# Patient Record
Sex: MALE | Race: WHITE | NOT HISPANIC OR LATINO | Employment: UNEMPLOYED | URBAN - METROPOLITAN AREA
[De-identification: names, ages, dates, MRNs, and addresses within clinical notes are randomized per-mention and may not be internally consistent; named-entity substitution may affect disease eponyms.]

---

## 2017-06-05 ENCOUNTER — APPOINTMENT (EMERGENCY)
Dept: RADIOLOGY | Facility: HOSPITAL | Age: 47
End: 2017-06-05
Payer: COMMERCIAL

## 2017-06-05 ENCOUNTER — HOSPITAL ENCOUNTER (EMERGENCY)
Facility: HOSPITAL | Age: 47
Discharge: HOME | End: 2017-06-05
Attending: EMERGENCY MEDICINE
Payer: COMMERCIAL

## 2017-06-05 VITALS
RESPIRATION RATE: 18 BRPM | OXYGEN SATURATION: 97 % | HEART RATE: 72 BPM | DIASTOLIC BLOOD PRESSURE: 97 MMHG | SYSTOLIC BLOOD PRESSURE: 144 MMHG

## 2017-06-05 DIAGNOSIS — M25.562 LEFT KNEE PAIN: ICD-10-CM
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
## 2017-06-05 DIAGNOSIS — M25.512 LEFT SHOULDER PAIN: ICD-10-CM

## 2017-06-05 LAB
ANION GAP BLD CALC-SCNC: 17 MMOL/L (ref 4–13)
BUN BLD-MCNC: 8 MG/DL (ref 5–25)
CA-I BLD-SCNC: 1.21 MMOL/L (ref 1.12–1.32)
CHLORIDE BLD-SCNC: 100 MMOL/L (ref 100–108)
CREAT BLD-MCNC: 1 MG/DL (ref 0.6–1.3)
GFR SERPL CREATININE-BSD FRML MDRD: >60 ML/MIN/1.73SQ M
GLUCOSE SERPL-MCNC: 98 MG/DL (ref 65–140)
HCT VFR BLD CALC: 47 % (ref 36.5–49.3)
HGB BLDA-MCNC: 16 G/DL (ref 12–17)
PCO2 BLD: 27 MMOL/L (ref 21–32)
POTASSIUM BLD-SCNC: 3.8 MMOL/L (ref 3.5–5.3)
SODIUM BLD-SCNC: 139 MMOL/L (ref 136–145)
SPECIMEN SOURCE: ABNORMAL

## 2017-06-05 PROCEDURE — 99284 EMERGENCY DEPT VISIT MOD MDM: CPT

## 2017-06-05 PROCEDURE — 73030 X-RAY EXAM OF SHOULDER: CPT

## 2017-06-05 PROCEDURE — 85014 HEMATOCRIT: CPT

## 2017-06-05 PROCEDURE — 74177 CT ABD & PELVIS W/CONTRAST: CPT

## 2017-06-05 PROCEDURE — 96374 THER/PROPH/DIAG INJ IV PUSH: CPT

## 2017-06-05 PROCEDURE — 72125 CT NECK SPINE W/O DYE: CPT

## 2017-06-05 PROCEDURE — 80047 BASIC METABLC PNL IONIZED CA: CPT

## 2017-06-05 PROCEDURE — 73564 X-RAY EXAM KNEE 4 OR MORE: CPT

## 2017-06-05 RX ORDER — KETOROLAC TROMETHAMINE 30 MG/ML
15 INJECTION, SOLUTION INTRAMUSCULAR; INTRAVENOUS ONCE
Status: COMPLETED | OUTPATIENT
Start: 2017-06-05 | End: 2017-06-05

## 2017-06-05 RX ORDER — CYCLOBENZAPRINE HCL 10 MG
10 TABLET ORAL 3 TIMES DAILY PRN
Status: DISCONTINUED | OUTPATIENT
Start: 2017-06-05 | End: 2017-06-06 | Stop reason: HOSPADM

## 2017-06-05 RX ADMIN — CYCLOBENZAPRINE HYDROCHLORIDE 10 MG: 10 TABLET ORAL at 21:15

## 2017-06-05 RX ADMIN — KETOROLAC TROMETHAMINE 15 MG: 30 INJECTION, SOLUTION INTRAMUSCULAR at 21:46

## 2017-06-05 RX ADMIN — IOHEXOL 100 ML: 350 INJECTION, SOLUTION INTRAVENOUS at 20:41

## 2018-11-27 PROBLEM — Z00.00 ENCOUNTER FOR PREVENTIVE HEALTH EXAMINATION: Status: ACTIVE | Noted: 2018-11-27

## 2019-01-15 ENCOUNTER — APPOINTMENT (OUTPATIENT)
Dept: SURGERY | Facility: CLINIC | Age: 49
End: 2019-01-15
Payer: MEDICAID

## 2019-01-15 VITALS — WEIGHT: 230 LBS | BODY MASS INDEX: 34.86 KG/M2 | HEIGHT: 68 IN

## 2019-01-15 DIAGNOSIS — M62.08 SEPARATION OF MUSCLE (NONTRAUMATIC), OTHER SITE: ICD-10-CM

## 2019-01-15 DIAGNOSIS — R19.00 INTRA-ABDOMINAL AND PELVIC SWELLING, MASS AND LUMP, UNSPECIFIED SITE: ICD-10-CM

## 2019-01-15 DIAGNOSIS — E66.09 OTHER OBESITY DUE TO EXCESS CALORIES: ICD-10-CM

## 2019-01-15 PROCEDURE — 99203 OFFICE O/P NEW LOW 30 MIN: CPT

## 2019-01-15 NOTE — PHYSICAL EXAM
[Normal Breath Sounds] : Normal breath sounds [No Rash or Lesion] : No rash or lesion [Alert] : alert [Calm] : calm [JVD] : no jugular venous distention  [de-identified] : overweight [de-identified] : normal [de-identified] : mildly protuberant abdomen\par  [de-identified] : diastasis recti

## 2019-01-15 NOTE — CONSULT LETTER
[Dear  ___] : Dear  [unfilled], [Courtesy Letter:] : I had the pleasure of seeing your patient, [unfilled], in my office today. [Please see my note below.] : Please see my note below. [Consult Closing:] : Thank you very much for allowing me to participate in the care of this patient.  If you have any questions, please do not hesitate to contact me. [FreeTextEntry3] : Respectfully,\par \par Steve Lane M.D., FACS\par  [DrTobin  ___] : Dr. GEORGE

## 2019-01-15 NOTE — ASSESSMENT
[FreeTextEntry1] : Jayy is a pleasant 48-year-old disabled gentleman with a past medical history significant for hypertension, hypercholesterolemia, migraine headaches, sleep apnea, arthritis, anxiety and depression, posttraumatic stress disorder, chronic pain due to multiple neck and back issues on daily narcotics with resultant chronic constipation who presents to the office with an enlarging and now intermittently mildly symptomatic bulge in the upper abdomen suspicious for a hernia. Of note, he was significantly heavier at one point and has lost the weight with dietary modification.\par \par Physical examination demonstrates a 2 fingerbreadth wide diastasis recti with no evidence of a true ventral hernia. He did have a triple hernia repair along with bilateral hydroceles by another surgeon in the past with no evidence of hernia recurrence at this time. His current BMI is 35.\par \par Jayy was counseled and reassured. We spoke about the etiology and natural progression of rectus diastasis and the importance of wearing an abdominal binder during any significant physical activity. The patient was also encouraged to avoid sit-ups and crunches, and was given educational materials offering alternative exercises to consider. We also discussed the importance of calorie restriction and healthy eating with regard to weight loss, hernia recurrence and one's overall health. He may return to me in the future if any hernia-related issues arise, of course.

## 2024-06-13 ENCOUNTER — OFFICE VISIT (OUTPATIENT)
Age: 54
End: 2024-06-13
Payer: MEDICARE

## 2024-06-13 VITALS
BODY MASS INDEX: 28.64 KG/M2 | WEIGHT: 189 LBS | OXYGEN SATURATION: 97 % | HEIGHT: 68 IN | DIASTOLIC BLOOD PRESSURE: 88 MMHG | SYSTOLIC BLOOD PRESSURE: 140 MMHG | HEART RATE: 94 BPM

## 2024-06-13 DIAGNOSIS — M62.838 MUSCLE SPASM: ICD-10-CM

## 2024-06-13 DIAGNOSIS — M99.02 SEGMENTAL DYSFUNCTION OF THORACIC REGION: ICD-10-CM

## 2024-06-13 DIAGNOSIS — M99.03 SEGMENTAL DYSFUNCTION OF LUMBAR REGION: Primary | ICD-10-CM

## 2024-06-13 DIAGNOSIS — M99.01 SEGMENTAL DYSFUNCTION OF CERVICAL REGION: ICD-10-CM

## 2024-06-13 DIAGNOSIS — M48.062 SPINAL STENOSIS OF LUMBAR REGION WITH NEUROGENIC CLAUDICATION: ICD-10-CM

## 2024-06-13 PROCEDURE — 98941 CHIROPRACT MANJ 3-4 REGIONS: CPT | Performed by: CHIROPRACTOR

## 2024-06-13 PROCEDURE — 99203 OFFICE O/P NEW LOW 30 MIN: CPT | Performed by: CHIROPRACTOR

## 2024-06-13 RX ORDER — METHYLNALTREXONE BROMIDE 150 MG/1
150 TABLET ORAL AS NEEDED
COMMUNITY
Start: 2024-06-07

## 2024-06-13 RX ORDER — IBUPROFEN 800 MG/1
800 TABLET ORAL EVERY 6 HOURS PRN
COMMUNITY
Start: 2024-06-08

## 2024-06-13 RX ORDER — ZOLPIDEM TARTRATE 10 MG/1
10 TABLET ORAL DAILY PRN
COMMUNITY
Start: 2024-05-08

## 2024-06-13 RX ORDER — DULOXETIN HYDROCHLORIDE 60 MG/1
60 CAPSULE, DELAYED RELEASE ORAL 2 TIMES DAILY
COMMUNITY
Start: 2024-06-07

## 2024-06-13 RX ORDER — FAMOTIDINE 40 MG/1
40 TABLET, FILM COATED ORAL DAILY
COMMUNITY

## 2024-06-13 RX ORDER — PRAZOSIN HYDROCHLORIDE 2 MG/1
2 CAPSULE ORAL
COMMUNITY
Start: 2024-05-14

## 2024-06-13 RX ORDER — HYDROCHLOROTHIAZIDE 25 MG/1
25 TABLET ORAL DAILY
COMMUNITY
Start: 2024-01-08

## 2024-06-13 RX ORDER — HYDROXYZINE 50 MG/1
50 TABLET, FILM COATED ORAL 3 TIMES DAILY
COMMUNITY
Start: 2024-05-29

## 2024-06-13 RX ORDER — KETOCONAZOLE 20 MG/ML
SHAMPOO TOPICAL ONCE
COMMUNITY
Start: 2024-04-15

## 2024-06-13 RX ORDER — HYALURONATE SODIUM 20 MG/2 ML
20 SYRINGE (ML) INTRAARTICULAR
COMMUNITY
Start: 2024-03-06

## 2024-06-13 RX ORDER — ATORVASTATIN CALCIUM 80 MG/1
80 TABLET, FILM COATED ORAL DAILY
COMMUNITY

## 2024-06-13 RX ORDER — ALBUTEROL SULFATE 90 UG/1
2 AEROSOL, METERED RESPIRATORY (INHALATION) EVERY 4 HOURS PRN
COMMUNITY
Start: 2024-05-24

## 2024-06-13 RX ORDER — SUCRALFATE 1 G/1
1 TABLET ORAL 4 TIMES DAILY
COMMUNITY
Start: 2024-04-29

## 2024-06-13 RX ORDER — POLYETHYLENE GLYCOL 3350 17 G/17G
17 POWDER, FOR SOLUTION ORAL AS NEEDED
COMMUNITY
Start: 2024-05-14

## 2024-06-13 RX ORDER — ENALAPRIL MALEATE 20 MG/1
1 TABLET ORAL 2 TIMES DAILY
COMMUNITY
Start: 2024-03-04

## 2024-06-13 RX ORDER — TIZANIDINE 4 MG/1
4 TABLET ORAL ONCE
COMMUNITY
Start: 2024-05-16

## 2024-06-13 RX ORDER — OXYCODONE AND ACETAMINOPHEN 10; 325 MG/1; MG/1
1 TABLET ORAL EVERY 8 HOURS PRN
COMMUNITY
Start: 2024-05-19

## 2024-06-13 RX ORDER — ALBUTEROL SULFATE 0.63 MG/3ML
0.63 SOLUTION RESPIRATORY (INHALATION) 3 TIMES DAILY PRN
COMMUNITY
Start: 2024-04-11

## 2024-06-13 RX ORDER — PANTOPRAZOLE SODIUM 40 MG/1
40 TABLET, DELAYED RELEASE ORAL DAILY
COMMUNITY
Start: 2024-04-01

## 2024-06-13 RX ORDER — CLOBETASOL PROPIONATE 0.46 MG/ML
SOLUTION TOPICAL 2 TIMES DAILY
COMMUNITY
Start: 2024-04-23

## 2024-06-13 RX ORDER — QUETIAPINE FUMARATE 300 MG/1
300 TABLET, FILM COATED ORAL
COMMUNITY
Start: 2024-05-14

## 2024-06-13 NOTE — PROGRESS NOTES
Initial date of service: 6/13/24    Diagnoses and all orders for this visit:    Segmental dysfunction of lumbar region    Spinal stenosis of lumbar region with neurogenic claudication    Segmental dysfunction of thoracic region    Segmental dysfunction of cervical region    Muscle spasm    Other orders  -     albuterol (ACCUNEB) 0.63 MG/3ML nebulizer solution; Take 0.63 mg by nebulization 3 (three) times a day as needed  -     albuterol (PROVENTIL HFA,VENTOLIN HFA) 90 mcg/act inhaler; Inhale 2 puffs every 4 (four) hours as needed for wheezing or shortness of breath  -     atorvastatin (LIPITOR) 80 mg tablet; Take 80 mg by mouth daily  -     clobetasol (TEMOVATE) 0.05 % external solution; Apply topically 2 (two) times a day  -     DULoxetine (CYMBALTA) 60 mg delayed release capsule; Take 60 mg by mouth 2 (two) times a day  -     enalapril (VASOTEC) 20 mg tablet; Take 1 tablet by mouth 2 (two) times a day  -     famotidine (PEPCID) 40 MG tablet; Take 40 mg by mouth daily  -     hydroCHLOROthiazide 25 mg tablet; Take 25 mg by mouth daily  -     hydrOXYzine HCL (ATARAX) 50 mg tablet; Take 50 mg by mouth 3 (three) times a day  -     ibuprofen (MOTRIN) 800 mg tablet; Take 800 mg by mouth every 6 (six) hours as needed  -     ketoconazole (NIZORAL) 2 % shampoo; Apply topically once  -     Relistor 150 MG TABS; Take 150 mg by mouth if needed  -     OxyCONTIN 40 MG 12 hr tablet; Take 40 mg by mouth every 8 (eight) hours  -     oxyCODONE-acetaminophen (PERCOCET)  mg per tablet; Take 1 tablet by mouth every 8 (eight) hours as needed  -     tiZANidine (ZANAFLEX) 4 mg tablet; Take 4 mg by mouth once  -     pantoprazole (PROTONIX) 40 mg tablet; Take 40 mg by mouth daily  -     polyethylene glycol (GLYCOLAX) 17 GM/SCOOP powder; Take 17 g by mouth if needed  -     prazosin (MINIPRESS) 2 mg capsule; Take 2 mg by mouth daily at bedtime  -     QUEtiapine (SEROquel) 300 mg tablet; Take 300 mg by mouth daily at bedtime  -      Euflexxa 20 MG/2ML SOSY; 20 mg  -     sucralfate (CARAFATE) 1 g tablet; Take 1 tablet by mouth 4 (four) times a day  -     zolpidem (AMBIEN) 10 mg tablet; Take 10 mg by mouth daily as needed    Pt's symptoms and exam findings consistent with mechanical neck/back pain secondary to repetitive st/sp injury in the setting of s/p cervical discectomy, L5/S1 fusion/listhesis/foraminal stenosis, exacerbated by core/glute deconditioning and postural/ergonomic stressors.   Pt responded well to IASTM, traction, stretches and manual mobilization of the affected spinal and myofascial jt dysfunction, with increased ROM; trial of conservative tx recommended consisting of stretching, ther-ex, graded mobilization/manipulation of the affected spinal/myofascial tissues, postural/ergonomic education, and take home stretches/exercises. Discussed we will not utilize HVLA to c/s and l/s due to prior surgeries.  If symptoms fail to improve with short trial of conservative care, appropriate imaging and referral will be coordinated.  Spent greater than 30 min c pt discussing hx, pe, ddx, tx options and reviewing notes/imaging    TREATMENT: [id number]  Fear avoidance behavior discussion, encouraged and reassured pt that natural course of condition is to improve over time with adherence to tx plan and home care strategies. Home care recommendations: avoid bed rest, walk (but avoid trails and uneven surfaces), gradual return to activity to tolerance (avoid anything that peripheralizes symptoms), ice 20 min on/off, watch for ice burn, call if symptoms peripheralize, worsen, or neurologic deficit progresses. Ther-ex: IASTM - discussed post procedure soreness and/or ecchymosis for up to 36 hrs, applied to affected mm hypertonicities; wall gurpreet, axial retraction, upper trap stretch, lev scap stretch, knee to chest stretch, supine t-stretch, transitional mvmt education, abdominal bracing;Thoracic mobilization/manipulation: prone P-A mob, supine A-P  manip; cervical mobilization/manipulation: traction, diversified supine graded mobilization; lumbar prone flexion-traction    HPI:  Margarito Goff is a 54 y.o. male   Chief Complaint   Patient presents with    Back Pain     Middle back pain-9  Lower back pain-9    Neck Pain     Neck pain-9     Pt presents for eval and tx for exacerbation of chronic neck/back pain secondary to DJD/DDD/stenosis/MS, hx of c/s fusion and l/s fusions; exacerbated by a bout of prolonged bedrest due to illness, which resulted in atrophy/weakness. Pt typically exercises in gym frequently. 2023 MRI demonstrates s/p fusion L5/S1, 1cm Spondylolytic spondylolisthesis of L5/S1 with moderate to severe foraminal stenosis. Pt reports hx of cervical discectomy. Pt reports responding well to chiro care in past; former chiro manipulated both c/s and l/s s/p surgeries      Back Pain  This is a chronic problem. The current episode started more than 1 year ago. The problem occurs daily. The problem has been waxing and waning since onset. The pain is present in the lumbar spine and thoracic spine. The quality of the pain is described as aching and stabbing. The pain radiates to the left knee, right thigh, right foot and left thigh. The pain is Worse during the day. The symptoms are aggravated by bending, position, standing and twisting.   Neck Pain   This is a chronic problem. The current episode started more than 1 year ago. The problem occurs daily. The problem has been waxing and waning. The pain is present in the left side, right side, midline and occipital region. The quality of the pain is described as aching and stabbing. The symptoms are aggravated by bending, position, stress and twisting. The pain is Worse during the day.     Past Medical History:   Diagnosis Date    Hypertension     Multiple sclerosis (HCC)       The following portions of the patient's history were reviewed and updated as appropriate: allergies, past family history, past medical  history, past social history, past surgical history, and problem list.  Review of Systems   Musculoskeletal:  Positive for back pain and neck pain.     Physical Exam  Eyes:      Extraocular Movements: Extraocular movements intact.   Neck:        Comments: Pnful and limited in Lrot, llf, Ext  Cardiovascular:      Pulses: Normal pulses.   Abdominal:      General: There is no distension.      Tenderness: There is no abdominal tenderness.   Musculoskeletal:      Cervical back: Pain with movement and muscular tenderness present. Decreased range of motion.      Thoracic back: Spasms and tenderness present. Decreased range of motion.      Lumbar back: Spasms and tenderness present. Decreased range of motion. Negative right straight leg raise test and negative left straight leg raise test.        Back:       Comments: Pnful and limited in Ext, Blf    Lymphadenopathy:      Cervical: No cervical adenopathy.   Skin:     General: Skin is warm and dry.   Neurological:      Mental Status: He is alert and oriented to person, place, and time.      Cranial Nerves: Cranial nerves 2-12 are intact.      Sensory: Sensation is intact.      Motor: Motor function is intact.      Coordination: Coordination is intact.      Gait: Gait is intact. Gait and tandem walk normal.      Deep Tendon Reflexes: Reflexes normal. Babinski sign absent on the right side. Babinski sign absent on the left side.      Reflex Scores:       Tricep reflexes are 2+ on the right side and 2+ on the left side.       Bicep reflexes are 2+ on the right side and 2+ on the left side.       Brachioradialis reflexes are 2+ on the right side and 2+ on the left side.       Patellar reflexes are 2+ on the right side and 2+ on the left side.       Achilles reflexes are 2+ on the right side and 2+ on the left side.  Psychiatric:         Mood and Affect: Mood and affect normal.         Behavior: Behavior normal.       SOFT TISSUE ASSESSMENT: Hypertonicity and tenderness palpated  B C5-T3, T6-9, L2-S1 erector spinae, upper traps, rhomboids JOINT RECTRICTIONS: C5-T3, T6-9, L3-S1 ORTHO: Myesha unremarkable for centralization/peripheralization; max foraminal comp elicits local np R/L; shoulder depression elicits stiffness in R/L upper trap; brachial plexus tension test elicits no neural tension in R/L UE; cervical distraction relieves CC; sitting root neg B; slump test neg B    Return in about 1 week (around 6/20/2024) for Next scheduled follow up.

## 2024-06-27 ENCOUNTER — PROCEDURE VISIT (OUTPATIENT)
Age: 54
End: 2024-06-27
Payer: MEDICARE

## 2024-06-27 VITALS
OXYGEN SATURATION: 98 % | SYSTOLIC BLOOD PRESSURE: 138 MMHG | BODY MASS INDEX: 28.64 KG/M2 | HEART RATE: 85 BPM | DIASTOLIC BLOOD PRESSURE: 86 MMHG | HEIGHT: 68 IN | WEIGHT: 189 LBS

## 2024-06-27 DIAGNOSIS — M99.01 SEGMENTAL DYSFUNCTION OF CERVICAL REGION: ICD-10-CM

## 2024-06-27 DIAGNOSIS — M62.838 MUSCLE SPASM: ICD-10-CM

## 2024-06-27 DIAGNOSIS — M99.02 SEGMENTAL DYSFUNCTION OF THORACIC REGION: ICD-10-CM

## 2024-06-27 DIAGNOSIS — M99.03 SEGMENTAL DYSFUNCTION OF LUMBAR REGION: Primary | ICD-10-CM

## 2024-06-27 DIAGNOSIS — M48.062 SPINAL STENOSIS OF LUMBAR REGION WITH NEUROGENIC CLAUDICATION: ICD-10-CM

## 2024-06-27 PROCEDURE — 98941 CHIROPRACT MANJ 3-4 REGIONS: CPT | Performed by: CHIROPRACTOR

## 2024-06-27 NOTE — PROGRESS NOTES
Initial date of service: 6/13/24    Diagnoses and all orders for this visit:    Segmental dysfunction of lumbar region    Spinal stenosis of lumbar region with neurogenic claudication    Segmental dysfunction of thoracic region    Segmental dysfunction of cervical region    Muscle spasm    Pt tolerated tx well    TREATMENT: [id number]  Ther-ex: IASTM - discussed post procedure soreness and/or ecchymosis for up to 36 hrs, applied to affected mm hypertonicities; wall gurpreet, axial retraction, upper trap stretch, lev scap stretch, knee to chest stretch, supine t-stretch, transitional mvmt education, abdominal bracing;Thoracic mobilization/manipulation: prone P-A mob, supine A-P manip; cervical mobilization/manipulation: traction, diversified supine graded mobilization; lumbar prone flexion-traction    HPI:  Margarito Goff is a 54 y.o. male   Chief Complaint   Patient presents with   • Back Pain     Middle back pain-8  Lower back pain-8   • Neck Pain     Neck pain-8     Pt presents for tx for exacerbation of chronic neck/back pain secondary to DJD/DDD/stenosis/MS, hx of c/s fusion and l/s fusions; exacerbated by a bout of prolonged bedrest due to illness, which resulted in atrophy/weakness. Pt typically exercises in gym frequently. 2023 MRI demonstrates s/p fusion L5/S1, 1cm Spondylolytic spondylolisthesis of L5/S1 with moderate to severe foraminal stenosis. Pt reports hx of cervical discectomy. Pt reports responding well to chiro care in past; former chiro manipulated both c/s and l/s s/p surgeries  6/27: pt reports feeling better after initial tx but suffered flare-up tossing and turning in bed    Back Pain  This is a chronic problem. The current episode started more than 1 year ago. The problem occurs daily. The problem has been waxing and waning since onset. The pain is present in the lumbar spine and thoracic spine. The quality of the pain is described as aching and stabbing. The pain radiates to the left knee, right thigh,  right foot and left thigh. The pain is Worse during the day. The symptoms are aggravated by bending, position, standing and twisting.   Neck Pain   This is a chronic problem. The current episode started more than 1 year ago. The problem occurs daily. The problem has been waxing and waning. The pain is present in the left side, right side, midline and occipital region. The quality of the pain is described as aching and stabbing. The symptoms are aggravated by bending, position, stress and twisting. The pain is Worse during the day.     Past Medical History:   Diagnosis Date   • Hypertension    • Multiple sclerosis (HCC)       The following portions of the patient's history were reviewed and updated as appropriate: allergies, past family history, past medical history, past social history, past surgical history, and problem list.  Review of Systems   Musculoskeletal:  Positive for back pain and neck pain.     Physical Exam  Neck:        Comments: Pnful and limited in Lrot, llf, Ext  Musculoskeletal:      Cervical back: Pain with movement and muscular tenderness present. Decreased range of motion.      Thoracic back: Spasms and tenderness present. Decreased range of motion.      Lumbar back: Spasms and tenderness present. Decreased range of motion. Negative right straight leg raise test and negative left straight leg raise test.        Back:       Comments: Pnful and limited in Ext, Blf    Lymphadenopathy:      Cervical: No cervical adenopathy.   Skin:     General: Skin is warm and dry.   Neurological:      Mental Status: He is alert and oriented to person, place, and time.      Coordination: Coordination is intact.      Gait: Gait is intact.   Psychiatric:         Mood and Affect: Mood and affect normal.         Behavior: Behavior normal.     SOFT TISSUE ASSESSMENT: Hypertonicity and tenderness palpated B C5-T3, T6-9, L2-S1 erector spinae, upper traps, rhomboids JOINT RECTRICTIONS: C5-T3, T6-9, L3-S1     Return in  about 1 week (around 7/4/2024) for Next scheduled follow up.